# Patient Record
Sex: FEMALE | Race: WHITE
[De-identification: names, ages, dates, MRNs, and addresses within clinical notes are randomized per-mention and may not be internally consistent; named-entity substitution may affect disease eponyms.]

---
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
## 2022-02-23 ENCOUNTER — HOSPITAL ENCOUNTER (EMERGENCY)
Dept: HOSPITAL 75 - ER FS | Age: 22
Discharge: HOME | End: 2022-02-23
Payer: COMMERCIAL

## 2022-02-23 VITALS — HEIGHT: 62.99 IN | BODY MASS INDEX: 22.5 KG/M2 | WEIGHT: 126.99 LBS

## 2022-02-23 VITALS — DIASTOLIC BLOOD PRESSURE: 73 MMHG | SYSTOLIC BLOOD PRESSURE: 124 MMHG

## 2022-02-23 DIAGNOSIS — R19.7: ICD-10-CM

## 2022-02-23 DIAGNOSIS — Z72.0: ICD-10-CM

## 2022-02-23 DIAGNOSIS — R10.84: Primary | ICD-10-CM

## 2022-02-23 DIAGNOSIS — R11.2: ICD-10-CM

## 2022-02-23 LAB
ALBUMIN SERPL-MCNC: 4.7 GM/DL (ref 3.2–4.5)
ALP SERPL-CCNC: 61 U/L (ref 40–136)
ALT SERPL-CCNC: 11 U/L (ref 0–55)
BASOPHILS # BLD AUTO: 0 10^3/UL (ref 0–0.1)
BASOPHILS NFR BLD AUTO: 0 % (ref 0–10)
BILIRUB SERPL-MCNC: 0.5 MG/DL (ref 0.1–1)
BUN/CREAT SERPL: 10
CALCIUM SERPL-MCNC: 9.7 MG/DL (ref 8.5–10.1)
CHLORIDE SERPL-SCNC: 103 MMOL/L (ref 98–107)
CO2 SERPL-SCNC: 23 MMOL/L (ref 21–32)
CREAT SERPL-MCNC: 0.68 MG/DL (ref 0.6–1.3)
EOSINOPHIL # BLD AUTO: 0.1 10^3/UL (ref 0–0.3)
EOSINOPHIL NFR BLD AUTO: 1 % (ref 0–10)
GFR SERPLBLD BASED ON 1.73 SQ M-ARVRAT: 128 ML/MIN
GLUCOSE SERPL-MCNC: 85 MG/DL (ref 70–105)
HCT VFR BLD CALC: 37 % (ref 35–52)
HGB BLD-MCNC: 12.6 G/DL (ref 11.5–16)
LYMPHOCYTES # BLD AUTO: 2.5 10^3/UL (ref 1–4)
LYMPHOCYTES NFR BLD AUTO: 29 % (ref 12–44)
MANUAL DIFFERENTIAL PERFORMED BLD QL: NO
MCH RBC QN AUTO: 31 PG (ref 25–34)
MCHC RBC AUTO-ENTMCNC: 35 G/DL (ref 32–36)
MCV RBC AUTO: 90 FL (ref 80–99)
MONOCYTES # BLD AUTO: 0.5 10^3/UL (ref 0–1)
MONOCYTES NFR BLD AUTO: 6 % (ref 0–12)
NEUTROPHILS # BLD AUTO: 5.5 10^3/UL (ref 1.8–7.8)
NEUTROPHILS NFR BLD AUTO: 64 % (ref 42–75)
PLATELET # BLD: 218 10^3/UL (ref 130–400)
PMV BLD AUTO: 8.8 FL (ref 9–12.2)
POTASSIUM SERPL-SCNC: 3.6 MMOL/L (ref 3.6–5)
PROT SERPL-MCNC: 7.5 GM/DL (ref 6.4–8.2)
SODIUM SERPL-SCNC: 138 MMOL/L (ref 135–145)
WBC # BLD AUTO: 8.7 10^3/UL (ref 4.3–11)

## 2022-02-23 PROCEDURE — 82947 ASSAY GLUCOSE BLOOD QUANT: CPT

## 2022-02-23 PROCEDURE — 36415 COLL VENOUS BLD VENIPUNCTURE: CPT

## 2022-02-23 PROCEDURE — 80053 COMPREHEN METABOLIC PANEL: CPT

## 2022-02-23 PROCEDURE — 99283 EMERGENCY DEPT VISIT LOW MDM: CPT

## 2022-02-23 PROCEDURE — 85025 COMPLETE CBC W/AUTO DIFF WBC: CPT

## 2022-02-23 NOTE — ED ABDOMINAL PAIN
General


Chief Complaint:  Abdominal/GI Problems


Stated Complaint:  DIARRHEA; VOMITING


Source of Information:  Patient





History of Present Illness


Date Seen by Provider:  Feb 23, 2022


Time Seen by Provider:  10:10


Initial Comments


Patient is a 21-year-old female presents with intermittent abdominal cramping 

with nausea vomiting and diarrhea for the past 2 days.  Patient states she 

continues to have watery diarrhea but has no chest pain having dry heaves.  She 

denies bilious vomiting or hematemesis or coffee-ground emesis.  No melena or 

hematochezia.  Patient reports chills and sweats but denies fever.  She denies 

dizziness lightheadedness or syncope.  No recent travel or antibiotics.  Last 

menstrual period was 3 weeks ago.  No prior abdominal surgeries.  No other 

symptoms or complaints.


Timing/Duration:  1-2 Days


Severity/Quality:  Moderate


Location:  Other


Radiation:  Other


Activities at Onset:  Other


Modifying Factors:  Improves With Other


Associated Symptoms:  Other





Allergies and Home Medications


Allergies


Coded Allergies:  


     No Known Drug Allergies (Unverified , 3/30/20)





Patient Home Medication List


Home Medication List Reviewed:  Yes


Cephalexin (Keflex) 500 Mg Capsule, 500 MG PO QID


   Prescribed by: EVI GARRISON on 3/30/20 2330


Hyoscyamine Sulfate (Levsin-Sl) 0.125 Mg Tab.subl, 0.125 MG SL Q4H


   Prescribed by: JANIS LEE on 9/4/20 0947


Ibuprofen (Ibuprofen) 600 Mg Tablet, 600 MG PO Q8H PRN for PAIN-MILD


   Prescribed by: JANIS LEE on 9/4/20 0953





Review of Systems


Review of Systems


Constitutional:  see HPI


EENTM:  See HPI


Respiratory:  See HPI


Cardiovascular:  See HPI


Gastrointestinal:  See HPI


Genitourinary:  See HPI


Musculoskeletal:  see HPI


Skin:  see HPI


Psychiatric/Neurological:  See HPI


Endocrine:  See HPI


Hematologic/Lymphatic:  See HPI





All Other Systems Reviewed


Negative Unless Noted:  Yes





Past Medical-Social-Family Hx


Patient Social History


Tobacco Use?:  Yes





Seasonal Allergies


Seasonal Allergies:  Yes





Past Medical History


Surgeries:  Yes


Orthopedic


Respiratory:  No


Cardiac:  No


Neurological:  No


Female Reproductive Disorders:  Ovarian Cyst


Genitourinary:  No


Gastrointestinal:  No


Musculoskeletal:  No


Endocrine:  No


HEENT:  No


Cancer:  No


Psychosocial:  No


Integumentary:  No


Blood Disorders:  No





Physical Exam


Vital Signs





Vital Signs - First Documented








 2/23/22





 10:06


 


Temp 36.6


 


Pulse 101


 


Resp 16


 


B/P (MAP) 128/63 (84)


 


O2 Delivery Room Air





Capillary Refill :


Height/Weight/BMI


Height: '"


Weight: lbs. oz. kg; 24.00 BMI


Method:


General Appearance:  WD/WN, no apparent distress


HEENT:  PERRL/EOMI, normal ENT inspection


Neck:  non-tender, full range of motion, supple


Respiratory:  lungs clear, normal breath sounds


Cardiovascular:  normal peripheral pulses


Gastrointestinal:  non tender, soft


Extremities:  normal range of motion, non-tender


Back:  normal inspection, no CVA tenderness


Neurologic/Psychiatric:  CNs II-XII nml as tested, no motor/sensory deficits, 

alert, oriented x 3





Focused Exam


Sepsis Stage:  Ruled Out





Progress/Results/Core Measures


Results/Orders


Lab Results





Laboratory Tests








Test


 2/23/22


10:27 2/23/22


10:38 Range/Units


 


 


Glucometer 89     MG/DL


 


White Blood Count


 


 8.7 


 4.3-11.0


10^3/uL


 


Red Blood Count


 


 4.06 


 3.80-5.11


10^6/uL


 


Hemoglobin  12.6  11.5-16.0  g/dL


 


Hematocrit  37  35-52  %


 


Mean Corpuscular Volume  90  80-99  fL


 


Mean Corpuscular Hemoglobin  31  25-34  pg


 


Mean Corpuscular Hemoglobin


Concent 


 35 


 32-36  g/dL





 


Red Cell Distribution Width  13.5  10.0-14.5  %


 


Platelet Count


 


 218 


 130-400


10^3/uL


 


Mean Platelet Volume  8.8 L 9.0-12.2  fL


 


Immature Granulocyte % (Auto)  0   %


 


Neutrophils (%) (Auto)  64  42-75  %


 


Lymphocytes (%) (Auto)  29  12-44  %


 


Monocytes (%) (Auto)  6  0-12  %


 


Eosinophils (%) (Auto)  1  0-10  %


 


Basophils (%) (Auto)  0  0-10  %


 


Neutrophils # (Auto)


 


 5.5 


 1.8-7.8


10^3/uL


 


Lymphocytes # (Auto)


 


 2.5 


 1.0-4.0


10^3/uL


 


Monocytes # (Auto)


 


 0.5 


 0.0-1.0


10^3/uL


 


Eosinophils # (Auto)


 


 0.1 


 0.0-0.3


10^3/uL


 


Basophils # (Auto)


 


 0.0 


 0.0-0.1


10^3/uL


 


Immature Granulocyte # (Auto)


 


 0.0 


 0.0-0.1


10^3/uL


 


Sodium Level  138  135-145  MMOL/L


 


Potassium Level  3.6  3.6-5.0  MMOL/L


 


Chloride Level  103    MMOL/L


 


Carbon Dioxide Level  23  21-32  MMOL/L


 


Anion Gap  12  5-14  MMOL/L


 


Blood Urea Nitrogen  7  7-18  MG/DL


 


Creatinine


 


 0.68 


 0.60-1.30


MG/DL


 


Estimat Glomerular Filtration


Rate 


 128 


  





 


BUN/Creatinine Ratio  10   


 


Glucose Level  85    MG/DL


 


Calcium Level  9.7  8.5-10.1  MG/DL


 


Corrected Calcium    8.5-10.1  MG/DL


 


Total Bilirubin  0.5  0.1-1.0  MG/DL


 


Aspartate Amino Transf


(AST/SGOT) 


 14 


 5-34  U/L





 


Alanine Aminotransferase


(ALT/SGPT) 


 11 


 0-55  U/L





 


Alkaline Phosphatase  61    U/L


 


Total Protein  7.5  6.4-8.2  GM/DL


 


Albumin  4.7 H 3.2-4.5  GM/DL








My Orders





Orders - AMARILIS PHAM DO


Ondansetron  Oral Dissolve Tab (Zofran (2/23/22 10:17)


Cbc With Automated Diff (2/23/22 10:31)


Comprehensive Metabolic Panel (2/23/22 10:31)


Ns Iv 1000 Ml (Sodium Chloride 0.9%) (2/23/22 10:45)


Famotidine Injection (Pepcid Injection) (2/23/22 10:45)


Ondansetron Injection (Zofran Injectio (2/23/22 10:45)





Medications Given in ED





Current Medications








 Medications  Dose


 Ordered  Sig/Briana


 Route  Start Time


 Stop Time Status Last Admin


Dose Admin


 


 Famotidine  20 mg  ONCE  ONCE


 IVP  2/23/22 10:45


 2/23/22 10:46 DC 2/23/22 10:47


20 MG


 


 Ondansetron HCl  4 mg  ONCE  ONCE


 IVP  2/23/22 10:45


 2/23/22 10:46 DC 2/23/22 10:47


4 MG








Vital Signs/I&O











 2/23/22





 10:06


 


Temp 36.6


 


Pulse 101


 


Resp 16


 


B/P (MAP) 128/63 (84)


 


O2 Delivery Room Air











Departure


Communication (Admissions)


Blood sugar is 89.  Lab work otherwise reassuring.  IV fluids and nausea 

medication given with clinical improvement.  Patient abdomen remains 

nonsurgical.  Recommendations supportive care watchful waiting with PCP follow-

up.  Courtesy work note provided.  Return precautions reviewed.  Patient 

verbalizes understanding agreement discharge instructions prior to departure.





Impression





   Primary Impression:  


   Abdominal pain


   Additional Impression:  


   Nausea, vomiting and diarrhea


Disposition:  01 HOME, SELF-CARE


Condition:  Stable





Departure-Patient Inst.


Decision time for Depature:  11:19


Referrals:  


ISMA ORTIZ (PCP)


Primary Care Physician








[facility]/K[facility]ST[facility] (Family)


Primary Care Physician


Patient Instructions:  Nausea and Vomiting, Adult, Abdominal Pain, Adult ED





Add. Discharge Instructions:  


You were evaluated in the emergency department for nausea vomiting and diarrhea.

 The exact cause of your symptoms has not been determined.  Please take nausea 

medication as directed and Pepto-Bismol as needed for diarrhea.  Drink clear 

liquids only for the next 6 to 12 hours then gradually increase to a bland diet 

as tolerated.  Follow-up with your PCP in 2 to 3 days if symptoms persist.  

Return to the ED if new or worsening symptoms.





All discharge instructions reviewed with patient and/or family. Voiced 

understanding.


Scripts


Famotidine (Pepcid) 20 Mg Tablet


20 MG PO Q12H, #10 TAB


   Prov: AMARILIS PHAM DO         2/23/22 


Ondansetron (Ondansetron Odt) 4 Mg Tab.rapdis


4 MG PO Q6H, #10 TAB


   Prov: AMARILIS PHAM DO         2/23/22


Work/School Note:  Work Release Form   Date Seen in the Emergency Department:  

Feb 23, 2022


   Return to Work:  Feb 25, 2022











AMARILIS PHAM DO                   Feb 23, 2022 10:27